# Patient Record
Sex: FEMALE | Race: WHITE | NOT HISPANIC OR LATINO | Employment: UNEMPLOYED | URBAN - METROPOLITAN AREA
[De-identification: names, ages, dates, MRNs, and addresses within clinical notes are randomized per-mention and may not be internally consistent; named-entity substitution may affect disease eponyms.]

---

## 2022-01-05 ENCOUNTER — HOSPITAL ENCOUNTER (EMERGENCY)
Facility: HOSPITAL | Age: 42
Discharge: HOME | End: 2022-01-05
Admitting: EMERGENCY MEDICINE

## 2022-01-05 ENCOUNTER — APPOINTMENT (OUTPATIENT)
Dept: GENERAL RADIOLOGY | Facility: HOSPITAL | Age: 42
End: 2022-01-05

## 2022-01-05 ENCOUNTER — APPOINTMENT (OUTPATIENT)
Dept: CT IMAGING | Facility: HOSPITAL | Age: 42
End: 2022-01-05

## 2022-01-05 VITALS
SYSTOLIC BLOOD PRESSURE: 152 MMHG | WEIGHT: 272.27 LBS | HEART RATE: 94 BPM | BODY MASS INDEX: 45.36 KG/M2 | DIASTOLIC BLOOD PRESSURE: 88 MMHG | OXYGEN SATURATION: 97 % | RESPIRATION RATE: 18 BRPM | TEMPERATURE: 99.1 F | HEIGHT: 65 IN

## 2022-01-05 DIAGNOSIS — J98.01 BRONCHOSPASM: ICD-10-CM

## 2022-01-05 DIAGNOSIS — R51.9 NONINTRACTABLE HEADACHE, UNSPECIFIED CHRONICITY PATTERN, UNSPECIFIED HEADACHE TYPE: ICD-10-CM

## 2022-01-05 DIAGNOSIS — U07.1 COVID: Primary | ICD-10-CM

## 2022-01-05 LAB
ALBUMIN SERPL-MCNC: 4 G/DL (ref 3.5–5.2)
ALBUMIN/GLOB SERPL: 1.2 G/DL
ALP SERPL-CCNC: 77 U/L (ref 39–117)
ALT SERPL W P-5'-P-CCNC: 8 U/L (ref 1–33)
ANION GAP SERPL CALCULATED.3IONS-SCNC: 12 MMOL/L (ref 5–15)
ANISOCYTOSIS BLD QL: NORMAL
AST SERPL-CCNC: 12 U/L (ref 1–32)
B PARAPERT DNA SPEC QL NAA+PROBE: NOT DETECTED
B PERT DNA SPEC QL NAA+PROBE: NOT DETECTED
B-HCG UR QL: NEGATIVE
BACTERIA UR QL AUTO: ABNORMAL /HPF
BASOPHILS # BLD AUTO: 0.1 10*3/MM3 (ref 0–0.2)
BASOPHILS NFR BLD AUTO: 1 % (ref 0–1.5)
BILIRUB SERPL-MCNC: 0.2 MG/DL (ref 0–1.2)
BILIRUB UR QL STRIP: NEGATIVE
BUN SERPL-MCNC: 5 MG/DL (ref 6–20)
BUN/CREAT SERPL: 6.8 (ref 7–25)
C PNEUM DNA NPH QL NAA+NON-PROBE: NOT DETECTED
CALCIUM SPEC-SCNC: 8.9 MG/DL (ref 8.6–10.5)
CHLORIDE SERPL-SCNC: 102 MMOL/L (ref 98–107)
CLARITY UR: CLEAR
CO2 SERPL-SCNC: 23 MMOL/L (ref 22–29)
COLOR UR: YELLOW
CREAT SERPL-MCNC: 0.73 MG/DL (ref 0.57–1)
D DIMER PPP FEU-MCNC: 0.61 MG/L (FEU) (ref 0–0.59)
DEPRECATED RDW RBC AUTO: 43.8 FL (ref 37–54)
EOSINOPHIL # BLD AUTO: 0.1 10*3/MM3 (ref 0–0.4)
EOSINOPHIL NFR BLD AUTO: 0.9 % (ref 0.3–6.2)
ERYTHROCYTE [DISTWIDTH] IN BLOOD BY AUTOMATED COUNT: 19.1 % (ref 12.3–15.4)
FERRITIN SERPL-MCNC: 7.21 NG/ML (ref 13–150)
FLUAV SUBTYP SPEC NAA+PROBE: NOT DETECTED
FLUBV RNA ISLT QL NAA+PROBE: NOT DETECTED
GFR SERPL CREATININE-BSD FRML MDRD: 88 ML/MIN/1.73
GLOBULIN UR ELPH-MCNC: 3.3 GM/DL
GLUCOSE BLDC GLUCOMTR-MCNC: 87 MG/DL (ref 70–105)
GLUCOSE SERPL-MCNC: 102 MG/DL (ref 65–99)
GLUCOSE UR STRIP-MCNC: NEGATIVE MG/DL
HADV DNA SPEC NAA+PROBE: NOT DETECTED
HCOV 229E RNA SPEC QL NAA+PROBE: NOT DETECTED
HCOV HKU1 RNA SPEC QL NAA+PROBE: NOT DETECTED
HCOV NL63 RNA SPEC QL NAA+PROBE: NOT DETECTED
HCOV OC43 RNA SPEC QL NAA+PROBE: NOT DETECTED
HCT VFR BLD AUTO: 27.4 % (ref 34–46.6)
HGB BLD-MCNC: 8.6 G/DL (ref 12–15.9)
HGB UR QL STRIP.AUTO: ABNORMAL
HMPV RNA NPH QL NAA+NON-PROBE: NOT DETECTED
HOLD SPECIMEN: NORMAL
HPIV1 RNA ISLT QL NAA+PROBE: NOT DETECTED
HPIV2 RNA SPEC QL NAA+PROBE: NOT DETECTED
HPIV3 RNA NPH QL NAA+PROBE: NOT DETECTED
HPIV4 P GENE NPH QL NAA+PROBE: NOT DETECTED
HYALINE CASTS UR QL AUTO: ABNORMAL /LPF
HYPOCHROMIA BLD QL: NORMAL
KETONES UR QL STRIP: NEGATIVE
LARGE PLATELETS: NORMAL
LDH SERPL-CCNC: 139 U/L (ref 135–214)
LEUKOCYTE ESTERASE UR QL STRIP.AUTO: NEGATIVE
LYMPHOCYTES # BLD AUTO: 0.5 10*3/MM3 (ref 0.7–3.1)
LYMPHOCYTES NFR BLD AUTO: 6.3 % (ref 19.6–45.3)
M PNEUMO IGG SER IA-ACNC: NOT DETECTED
MCH RBC QN AUTO: 20.3 PG (ref 26.6–33)
MCHC RBC AUTO-ENTMCNC: 31.3 G/DL (ref 31.5–35.7)
MCV RBC AUTO: 64.8 FL (ref 79–97)
MICROCYTES BLD QL: NORMAL
MONOCYTES # BLD AUTO: 0.5 10*3/MM3 (ref 0.1–0.9)
MONOCYTES NFR BLD AUTO: 7.2 % (ref 5–12)
NEUTROPHILS NFR BLD AUTO: 6.2 10*3/MM3 (ref 1.7–7)
NEUTROPHILS NFR BLD AUTO: 84.6 % (ref 42.7–76)
NITRITE UR QL STRIP: NEGATIVE
NRBC BLD AUTO-RTO: 0 /100 WBC (ref 0–0.2)
OVALOCYTES BLD QL SMEAR: NORMAL
PH UR STRIP.AUTO: 5.5 [PH] (ref 5–8)
PLATELET # BLD AUTO: 316 10*3/MM3 (ref 140–450)
PMV BLD AUTO: 6.7 FL (ref 6–12)
POLYCHROMASIA BLD QL SMEAR: NORMAL
POTASSIUM SERPL-SCNC: 3.8 MMOL/L (ref 3.5–5.2)
PROT SERPL-MCNC: 7.3 G/DL (ref 6–8.5)
PROT UR QL STRIP: NEGATIVE
RBC # BLD AUTO: 4.23 10*6/MM3 (ref 3.77–5.28)
RBC # UR STRIP: ABNORMAL /HPF
REF LAB TEST METHOD: ABNORMAL
RHINOVIRUS RNA SPEC NAA+PROBE: NOT DETECTED
RSV RNA NPH QL NAA+NON-PROBE: NOT DETECTED
S PYO AG THROAT QL: NEGATIVE
SARS-COV-2 RNA NPH QL NAA+NON-PROBE: DETECTED
SODIUM SERPL-SCNC: 137 MMOL/L (ref 136–145)
SP GR UR STRIP: 1.06 (ref 1–1.03)
SQUAMOUS #/AREA URNS HPF: ABNORMAL /HPF
STOMATOCYTES BLD QL SMEAR: NORMAL
TROPONIN T SERPL-MCNC: <0.01 NG/ML (ref 0–0.03)
UROBILINOGEN UR QL STRIP: ABNORMAL
WBC # UR STRIP: ABNORMAL /HPF
WBC MORPH BLD: NORMAL
WBC NRBC COR # BLD: 7.3 10*3/MM3 (ref 3.4–10.8)

## 2022-01-05 PROCEDURE — 85379 FIBRIN DEGRADATION QUANT: CPT | Performed by: PHYSICIAN ASSISTANT

## 2022-01-05 PROCEDURE — 85025 COMPLETE CBC W/AUTO DIFF WBC: CPT | Performed by: PHYSICIAN ASSISTANT

## 2022-01-05 PROCEDURE — 80053 COMPREHEN METABOLIC PANEL: CPT | Performed by: PHYSICIAN ASSISTANT

## 2022-01-05 PROCEDURE — 36415 COLL VENOUS BLD VENIPUNCTURE: CPT

## 2022-01-05 PROCEDURE — 71275 CT ANGIOGRAPHY CHEST: CPT

## 2022-01-05 PROCEDURE — 83615 LACTATE (LD) (LDH) ENZYME: CPT | Performed by: PHYSICIAN ASSISTANT

## 2022-01-05 PROCEDURE — 84484 ASSAY OF TROPONIN QUANT: CPT | Performed by: PHYSICIAN ASSISTANT

## 2022-01-05 PROCEDURE — 81025 URINE PREGNANCY TEST: CPT | Performed by: PHYSICIAN ASSISTANT

## 2022-01-05 PROCEDURE — 81001 URINALYSIS AUTO W/SCOPE: CPT | Performed by: PHYSICIAN ASSISTANT

## 2022-01-05 PROCEDURE — 93005 ELECTROCARDIOGRAM TRACING: CPT | Performed by: PHYSICIAN ASSISTANT

## 2022-01-05 PROCEDURE — 71045 X-RAY EXAM CHEST 1 VIEW: CPT

## 2022-01-05 PROCEDURE — 87651 STREP A DNA AMP PROBE: CPT | Performed by: PHYSICIAN ASSISTANT

## 2022-01-05 PROCEDURE — 0 IOPAMIDOL PER 1 ML: Performed by: PHYSICIAN ASSISTANT

## 2022-01-05 PROCEDURE — 82728 ASSAY OF FERRITIN: CPT | Performed by: PHYSICIAN ASSISTANT

## 2022-01-05 PROCEDURE — 0202U NFCT DS 22 TRGT SARS-COV-2: CPT | Performed by: PHYSICIAN ASSISTANT

## 2022-01-05 PROCEDURE — 70450 CT HEAD/BRAIN W/O DYE: CPT

## 2022-01-05 PROCEDURE — 82962 GLUCOSE BLOOD TEST: CPT

## 2022-01-05 PROCEDURE — 99284 EMERGENCY DEPT VISIT MOD MDM: CPT

## 2022-01-05 PROCEDURE — 85007 BL SMEAR W/DIFF WBC COUNT: CPT | Performed by: PHYSICIAN ASSISTANT

## 2022-01-05 RX ORDER — ONDANSETRON 4 MG/1
4 TABLET, ORALLY DISINTEGRATING ORAL EVERY 8 HOURS PRN
Qty: 20 TABLET | Refills: 0 | Status: SHIPPED | OUTPATIENT
Start: 2022-01-05

## 2022-01-05 RX ORDER — ACETAMINOPHEN 500 MG
1000 TABLET ORAL ONCE
Status: COMPLETED | OUTPATIENT
Start: 2022-01-05 | End: 2022-01-05

## 2022-01-05 RX ORDER — BROMPHENIRAMINE MALEATE, PSEUDOEPHEDRINE HYDROCHLORIDE, AND DEXTROMETHORPHAN HYDROBROMIDE 2; 30; 10 MG/5ML; MG/5ML; MG/5ML
5 SYRUP ORAL 4 TIMES DAILY PRN
Qty: 473 ML | Refills: 0 | Status: SHIPPED | OUTPATIENT
Start: 2022-01-05 | End: 2022-09-15

## 2022-01-05 RX ORDER — SODIUM CHLORIDE 0.9 % (FLUSH) 0.9 %
10 SYRINGE (ML) INJECTION AS NEEDED
Status: DISCONTINUED | OUTPATIENT
Start: 2022-01-05 | End: 2022-01-06 | Stop reason: HOSPADM

## 2022-01-05 RX ADMIN — SODIUM CHLORIDE 1000 ML: 9 INJECTION, SOLUTION INTRAVENOUS at 16:24

## 2022-01-05 RX ADMIN — ACETAMINOPHEN 1000 MG: 500 TABLET ORAL at 20:43

## 2022-01-05 RX ADMIN — IOPAMIDOL 100 ML: 755 INJECTION, SOLUTION INTRAVENOUS at 17:42

## 2022-01-05 NOTE — ED PROVIDER NOTES
Subjective   Chief Complaint: Headache, body aches, cough, dizzy, multiple complaints    Patient is a 41-year-old  female history of asthma presents to the ER with multiple complaints.  Patient states that she woke up this morning at 7 AM feeling lightheaded and dizzy.  She also reports a headache that is diffuse, throbbing that she rates a 10/10.  She reports some intermittent blurry vision in her right eye, no loss of vision.  She denies any slurred speech or facial droop.  She reports some chest pain and tightness that is worse with coughing.  She reports mildly productive cough but says that she has been swallowing all of her phlegm.  She does report sore throat and right ear pain.  Patient also complains of generalized body aches, abdominal pain, nausea and one episode of vomiting today which she reports had small specks of blood in it.  She denies any diarrhea.  Patient also reports dysuria.  She reports objective fever and chills has not taken her temperature at home.  Patient states that she did receive the Covid vaccine.    Location: Head    Quality: Throbbing    Duration: Today    Timing: Constant    Severity: Moderate severe    Associated Symptoms: Cough, sore throat, earache, body aches, chills    PCP: None      History provided by:  Patient      Review of Systems   Constitutional: Positive for chills. Negative for fever.   HENT: Positive for congestion, ear pain and sore throat. Negative for trouble swallowing.    Respiratory: Positive for cough, chest tightness, shortness of breath and wheezing.    Cardiovascular: Negative for chest pain.   Gastrointestinal: Positive for abdominal pain, nausea and vomiting. Negative for constipation and diarrhea.   Genitourinary: Negative for dysuria.   Musculoskeletal: Positive for myalgias.   Skin: Negative for rash.   Neurological: Positive for dizziness, weakness, light-headedness and headaches.   Psychiatric/Behavioral: Negative for behavioral problems.    All other systems reviewed and are negative.      History reviewed. No pertinent past medical history.    Allergies   Allergen Reactions   • Ibuprofen Anaphylaxis       History reviewed. No pertinent surgical history.    History reviewed. No pertinent family history.    Social History     Socioeconomic History   • Marital status: Single           Objective   Physical Exam  Vitals and nursing note reviewed.   Constitutional:       Appearance: Normal appearance. She is well-developed. She is obese. She is not ill-appearing or toxic-appearing.   HENT:      Head: Normocephalic and atraumatic.      Right Ear: External ear normal. There is no impacted cerumen.      Left Ear: External ear normal. There is no impacted cerumen.      Ears:      Comments: Erythematous, bulging right TM, no effusion     Nose: Nose normal.      Mouth/Throat:      Pharynx: Posterior oropharyngeal erythema present. No oropharyngeal exudate.      Comments: Posterior oropharynx erythema, right tonsillar swelling 2+, no stones, uvula is midline  Eyes:      Pupils: Pupils are equal, round, and reactive to light.   Neck:      Comments: Right anterior cervical adenopathy  Cardiovascular:      Rate and Rhythm: Regular rhythm. Tachycardia present.      Pulses: Normal pulses.      Heart sounds: Normal heart sounds. No murmur heard.      Pulmonary:      Effort: Pulmonary effort is normal. No respiratory distress.      Breath sounds: Wheezing present.   Chest:      Chest wall: No tenderness.   Abdominal:      General: Bowel sounds are normal. There is no distension.      Palpations: Abdomen is soft.      Tenderness: There is no abdominal tenderness.   Musculoskeletal:         General: Normal range of motion.      Cervical back: Normal range of motion and neck supple.   Lymphadenopathy:      Cervical: Cervical adenopathy present.   Skin:     General: Skin is warm and dry.      Capillary Refill: Capillary refill takes less than 2 seconds.      Findings: No  "erythema or rash.   Neurological:      General: No focal deficit present.      Mental Status: She is alert and oriented to person, place, and time.      Cranial Nerves: No cranial nerve deficit.      Sensory: No sensory deficit.   Psychiatric:         Mood and Affect: Mood normal.         Behavior: Behavior normal.         ECG 12 Lead      Date/Time: 1/6/2022 12:48 AM  Performed by: Danelle Randhawa PA  Authorized by: Danelle Randhawa PA   Interpreted by physician  Previous ECG: no previous ECG available  Rhythm: sinus tachycardia  Rate: tachycardic  BPM: 106  QRS axis: normal  Conduction: conduction normal  ST Segments: ST segments normal  T Waves: T waves normal  Other: no other findings  Clinical impression: non-specific ECG                 ED Course  ED Course as of 01/06/22 0049   Wed Jan 05, 2022 1929 Patient reevaluated, she reports that she feels somewhat better.  Patient is requesting something for her headache.  Patient notified of Covid test results [MM]      ED Course User Index  [MM] Danelle Randhawa PA    /88   Pulse 94   Temp 99.1 °F (37.3 °C)   Resp 18   Ht 165.1 cm (65\")   Wt 124 kg (272 lb 4.3 oz)   LMP 01/05/2022   SpO2 97%   Breastfeeding No   BMI 45.31 kg/m²   Labs Reviewed   RESPIRATORY PANEL PCR W/ COVID-19 (SARS-COV-2) REGINALD/CRISTIAN/COLIN/PAD/[facility] IN-HOUSE, NP SWAB IN UTM/VTP, 3-4 HR TAT - Abnormal; Notable for the following components:       Result Value    COVID19 Detected (*)     All other components within normal limits    Narrative:     In the setting of a positive respiratory panel with a viral infection PLUS a negative procalcitonin without other underlying concern for bacterial infection, consider observing off antibiotics or discontinuation of antibiotics and continue supportive care. If the respiratory panel is positive for atypical bacterial infection (Bordetella pertussis, Chlamydophila pneumoniae, or Mycoplasma pneumoniae), consider antibiotic de-escalation to " target atypical bacterial infection.   COMPREHENSIVE METABOLIC PANEL - Abnormal; Notable for the following components:    Glucose 102 (*)     BUN 5 (*)     BUN/Creatinine Ratio 6.8 (*)     All other components within normal limits    Narrative:     GFR Normal >60  Chronic Kidney Disease <60  Kidney Failure <15     D-DIMER, QUANTITATIVE - Abnormal; Notable for the following components:    D-Dimer, Quantitative 0.61 (*)     All other components within normal limits    Narrative:     Reference Range  --------------------------------------------------------------------     < 0.50   Negative Predictive Value  0.50-0.59   Indeterminate    >= 0.60   Probable VTE             A very low percentage of patients with DVT may yield D-Dimer results   below the cut-off of 0.50 mg/L FEU.  This is known to be more   prevalent in patients with distal DVT.             Results of this test should always be interpreted in conjunction with   the patient's medical history, clinical presentation and other   findings.  Clinical diagnosis should not be based on the result of   INNOVANCE D-Dimer alone.   FERRITIN - Abnormal; Notable for the following components:    Ferritin 7.21 (*)     All other components within normal limits    Narrative:     Results may be falsely decreased if patient taking Biotin.     URINALYSIS W/ CULTURE IF INDICATED - Abnormal; Notable for the following components:    Specific Gravity, UA 1.065 (*)     Blood, UA Trace (*)     All other components within normal limits   CBC WITH AUTO DIFFERENTIAL - Abnormal; Notable for the following components:    Hemoglobin 8.6 (*)     Hematocrit 27.4 (*)     MCV 64.8 (*)     MCH 20.3 (*)     MCHC 31.3 (*)     RDW 19.1 (*)     Neutrophil % 84.6 (*)     Lymphocyte % 6.3 (*)     Lymphocytes, Absolute 0.50 (*)     All other components within normal limits    Narrative:     Appended report. These results have been appended to a previously verified report.   URINALYSIS, MICROSCOPIC ONLY  - Abnormal; Notable for the following components:    RBC, UA 0-2 (*)     WBC, UA 0-2 (*)     All other components within normal limits   RAPID STREP A SCREEN - Normal   LACTATE DEHYDROGENASE - Normal   TROPONIN (IN-HOUSE) - Normal    Narrative:     Troponin T Reference Range:  <= 0.03 ng/mL-   Negative for AMI  >0.03 ng/mL-     Abnormal for myocardial necrosis.  Clinicians would have to utilize clinical acumen, EKG, Troponin and serial changes to determine if it is an Acute Myocardial Infarction or myocardial injury due to an underlying chronic condition.       Results may be falsely decreased if patient taking Biotin.     PREGNANCY, URINE - Normal   POCT GLUCOSE FINGERSTICK - Normal   RAINBOW DRAW    Narrative:     The following orders were created for panel order [place] Draw.  Procedure                               Abnormality         Status                     ---------                               -----------         ------                     Green Top (Gel)[[id number]]                                                             Lavender Top[[id number]]                                                                Gold Top - SST[[id number]]                                   Final result               Light Blue Top[[id number]]                                                                Please view results for these tests on the individual orders.   SCAN SLIDE   GOLD TOP - SST   CBC AND DIFFERENTIAL    Narrative:     The following orders were created for panel order CBC & Differential.  Procedure                               Abnormality         Status                     ---------                               -----------         ------                     CBC Auto Differential[[id number]]        Abnormal            Final result               Scan Slide[[id number]]                                       Final result                 Please view results for these tests on the individual orders.     Medications    sodium chloride 0.9 % flush 10 mL (has no administration in time range)   sodium chloride 0.9 % bolus 1,000 mL (0 mL Intravenous Stopped 1/5/22 2043)   iopamidol (ISOVUE-370) 76 % injection 100 mL (100 mL Intravenous Given 1/5/22 1742)   acetaminophen (TYLENOL) tablet 1,000 mg (1,000 mg Oral Given 1/5/22 2043)     CT Head Without Contrast    Result Date: 1/5/2022  No acute process demonstrated  Electronically Signed By-Zac Mcgarry On:1/5/2022 5:50 PM This report was finalized on [id number] by  Zac Mcgarry, .    XR Chest 1 View    Result Date: 1/5/2022  No active cardiac pulmonary disease  Electronically Signed By-Zac Mcgarry On:1/5/2022 4:41 PM This report was finalized on [id number] by  Zac Mcgarry, .    CT Chest Pulmonary Embolism    Result Date: 1/5/2022   1. No evidence of pulmonary embolism 2. Mild bronchial wall thickening suggesting bronchitis. No evidence of pneumonia  Electronically Signed ByMiguel Mcgarry On:1/5/2022 5:53 PM This report was finalized on [id number] by  Zac Mcgarry, .                                                 MDM  Number of Diagnoses or Management Options  Bronchospasm  COVID  Nonintractable headache, unspecified chronicity pattern, unspecified headache type  Diagnosis management comments: MEDICAL DECISION  Epic Chart Review: No recent admissions  Comorbidities: Asthma  Differentials: Pneumonia, Covid, viral syndrome, strep, PE; this list is not all inclusive and does not constitute the entirety of considered causes  Radiology interpretation:  Images reviewed by me and interpreted by radiologist, as above  Lab interpretation:  Labs viewed by me significant for, as above  EKG interpretation: Reviewed by myself interpreted by Dr. Stevenson, sinus tachycardia with a rate of 106 with no acute ST changes.    While in the ED IV was placed and labs were obtained appropriate PPE was worn during exam and throughout all encounters with the patient.  Patient had the  above evaluation.  IV established, lab work obtained.  Patient given Tylenol for headache.  Patient also given 1 L IV fluids.  Restaurant panel positive for Covid.  Urinalysis negative for UTI.  Strep negative.  CMP glucose 102, BUN 5, otherwise unremarkable.  Troponin normal.  CBC mild anemia hemoglobin 8.6, hematocrit 27.4.  D-dimer slightly elevated 0.61, subsequent PE protocol was ordered, no evidence of PE, there is mild bronchial wall thickening suggesting acute bronchitis, likely viral secondary to Covid.  No obvious pneumonia.  CT head shows no acute intracranial abnormality.  On reevaluation patient reports that she feels better.  Patient remained afebrile, nontoxic parents in no acute respiratory stress while in the ER.  Patient's symptoms likely related to Covid and viral syndrome.  Patient remained nonhypoxic throughout ER stay.  Patient discharged with prescription for Bromfed and Zofran she was encouraged to follow-up with her primary care provider for further management and evaluation.  Patient courage to quarantine for the next 5 days and continue to wear a mask around other people for the remaining 5 days after that.    Discharge plan and instructions were discussed with the patient who verbalized understanding and is in agreement with the plan, all questions were answered at this time.  Patient is aware of signs symptoms that would require immediate return to the emergency room.  Patient understands importance of following up with primary care provider for further evaluation and worsening concerns as well as blood pressure recheck in the next 4 weeks.    Patient was discharged in improved stable condition with an upright steady gait.         Amount and/or Complexity of Data Reviewed  Clinical lab tests: reviewed and ordered  Tests in the radiology section of CPT®: reviewed and ordered  Tests in the medicine section of CPT®: reviewed    Patient Progress  Patient progress: stable      Final  diagnoses:   COVID   Nonintractable headache, unspecified chronicity pattern, unspecified headache type   Bronchospasm       ED Disposition  ED Disposition     ED Disposition Condition Comment    Discharge Stable           Rafael Nick MD  [address] [facility] [address]  [place] [address] [phone]    Schedule an appointment as soon as possible for a visit in 2 days  If symptoms worsen, Return to the ER for new or worsening symptoms         Medication List      New Prescriptions    brompheniramine-pseudoephedrine-DM 30-2-10 MG/5ML syrup  Take 5 mL by mouth 4 (Four) Times a Day As Needed for Congestion or Cough.     ondansetron ODT 4 MG disintegrating tablet  Commonly known as: Zofran ODT  Place 1 tablet under the tongue Every 8 (Eight) Hours As Needed for Nausea.           Where to Get Your Medications      These medications were sent to [facility]/pharmacy #[id number] - [place], [place] - [address] [facility] AT [facility] - [phone] [facility] [phone] [address] [place] [address]    Phone: [phone]   · brompheniramine-pseudoephedrine-DM 30-2-10 MG/5ML syrup  · ondansetron ODT 4 MG disintegrating tablet          Danelle Randhawa PA  01/06/22 0049

## 2022-01-06 NOTE — DISCHARGE INSTRUCTIONS
Take Bromfed as needed for cough and congestion  Take Tylenol as needed for headache or fever  Take Zofran as needed for nausea vomiting    You must quarantine for the next 5 days, after this you must continue to wear a mask for the next 5 days.  Drink plenty of fluids    Follow-up with your primary care provider within the next few days.    Return to the ER for new or worsening symptoms

## 2022-01-09 LAB — QT INTERVAL: 336 MS

## 2022-09-09 ENCOUNTER — TELEPHONE (OUTPATIENT)
Dept: ONCOLOGY | Facility: CLINIC | Age: 42
End: 2022-09-09

## 2022-09-14 NOTE — PROGRESS NOTES
HEMATOLOGY ONCOLOGY OUTPATIENT CONSULTATION       Patient name: Aurelia Arvizu  : 1980  MRN: [id number]  Primary Care Physician: Rafael Nick MD  Referring Physician: Rafael Nick MD  Reason For Consult:     Chief Complaint   Patient presents with   • Follow-up     Microcytic, hypochromic anemia     HPI:   History of Present Illness:  Aurelia Arvizu is 42 y.o. female who presented to the office on 09/15/22 for consultation regarding microcytic hypochromic anemia    9/15/2022: In the office for the first time.  Close to the time of this visit had developed an upper respiratory tract infection.  She was seen by her primary care physician who, among other things, ordered laboratory exams.  A blood count, apparently, reported microcytic and hypochromic anemia and she was referred for investigation.  At the time of this visit she was complaining of a persistent cough for which she had been receiving treatment.  She denied any fevers or unintended weight loss.  On direct questioning admitted to heavy menstrual bleeding every 4 weeks.  She described having to wear 2 pads at a time to prevent bleeding through and having to change between 4 and 6 times during the day and usually around 2 times during the night.    Subjective:  • 09/15/22.  Found to have microcytic anemia on a routine blood count.  Gave a history of menorrhagia.  Denied any history of hemoptysis or hematemesis.  She also denied melena or hematochezia but admitted to menorrhalgia as described above.    The following portions of the patient's history were reviewed and updated as appropriate: allergies, current medications, past family history, past medical history, past social history, past surgical history and problem list.    Past Medical History:   Diagnosis Date   • Bipolar disorder (HCC)      Past Surgical History:   Procedure Laterality Date   • CHOLECYSTECTOMY         Current Outpatient Medications:    •  albuterol sulfate  (90 Base) MCG/ACT inhaler, albuterol sulfate HFA 90 mcg/actuation aerosol inhaler  INHALE 2 PUFFS EVERY 4 HOURS BY INHALATION ROUTE., Disp: , Rfl:   •  Cariprazine HCl (VRAYLAR) 1.5 MG capsule capsule, Vraylar 1.5 mg capsule  TAKE 1 CAPSULE BY MOUTH EVERY DAY, Disp: , Rfl:   •  Cholecalciferol 125 MCG (5000 UT) tablet, Vitamin D3 125 mcg (5,000 unit) tablet  Take 1 tablet every day by oral route., Disp: , Rfl:   •  famotidine (PEPCID) 20 MG tablet, famotidine 20 mg tablet  TAKE 1 TABLET BY MOUTH TWICE A DAY, Disp: , Rfl:   •  lamoTRIgine (LaMICtal) 100 MG tablet, LAMICTAL TABS, Disp: , Rfl:   •  Lurasidone HCl (LATUDA) 20 MG tablet tablet, Latuda 20 mg tablet  TAKE 1 TABLET BY MOUTH EVERY DAY, Disp: , Rfl:   •  naproxen (NAPROSYN) 500 MG tablet, NAPROXEN 500 MG TABS, Disp: , Rfl:   •  ondansetron ODT (Zofran ODT) 4 MG disintegrating tablet, Place 1 tablet under the tongue Every 8 (Eight) Hours As Needed for Nausea., Disp: 20 tablet, Rfl: 0  •  raNITIdine (ZANTAC) 150 MG tablet, ranitidine 150 mg tablet, Disp: , Rfl:   •  sertraline (ZOLOFT) 100 MG tablet, sertraline 100 mg tablet  TAKE 1 TABLET BY MOUTH EVERY DAY, Disp: , Rfl:   •  topiramate (TOPAMAX) 50 MG tablet, Every 12 (Twelve) Hours., Disp: , Rfl:     Allergies   Allergen Reactions   • Ibuprofen Anaphylaxis     Family History   Problem Relation Age of Onset   • Ovarian cancer Mother 46   • Kidney cancer Mother 54     Cancer-related family history includes Kidney cancer (age of onset: 54) in her mother; Ovarian cancer (age of onset: 46) in her mother.    Social History     Tobacco Use   • Smoking status: Never Smoker   • Smokeless tobacco: Never Used   Vaping Use   • Vaping Use: Never used   Substance Use Topics   • Alcohol use: Yes     Comment: Occasional   • Drug use: Never     Social History     Social History Narrative   • Not on file      ROS:     Review of Systems   Constitutional: Positive for fatigue. Negative for  "activity change, appetite change, chills, diaphoresis, fever and unexpected weight change.   HENT: Negative for congestion, dental problem, drooling, ear discharge, ear pain, facial swelling, hearing loss, mouth sores, nosebleeds, postnasal drip, rhinorrhea, sinus pressure, sinus pain, sneezing, sore throat, tinnitus, trouble swallowing and voice change.    Eyes: Negative for photophobia, pain, discharge, redness, itching and visual disturbance.   Respiratory: Positive for cough. Negative for apnea, choking, chest tightness, shortness of breath, wheezing and stridor.    Cardiovascular: Negative for chest pain, palpitations and leg swelling.   Gastrointestinal: Negative for abdominal distention, abdominal pain, anal bleeding, blood in stool, constipation, diarrhea, nausea, rectal pain and vomiting.   Endocrine: Negative for cold intolerance, heat intolerance, polydipsia and polyuria.   Genitourinary: Negative for decreased urine volume, difficulty urinating, dysuria, flank pain, frequency, genital sores, hematuria and urgency.   Musculoskeletal: Negative for arthralgias, back pain, gait problem, joint swelling, myalgias, neck pain and neck stiffness.   Skin: Negative for color change, pallor and rash.   Neurological: Negative for dizziness, tremors, seizures, syncope, facial asymmetry, speech difficulty, weakness, light-headedness, numbness and headaches.   Hematological: Negative for adenopathy. Does not bruise/bleed easily.   Psychiatric/Behavioral: Negative for agitation, behavioral problems, confusion, decreased concentration, hallucinations, self-injury, sleep disturbance and suicidal ideas. The patient is not nervous/anxious.      Objective:    Vitals:    09/15/22 1308   BP: 112/84   Pulse: 104   SpO2: 98%   Weight: 119 kg (263 lb)  Comment: verbal   Height: 165.1 cm (65\")   PainSc: 0-No pain     Body mass index is 43.77 kg/m².  ECOG  (0) Fully active, able to carry on all predisease performance without " restriction    Physical Exam:     Physical Exam  Constitutional:       General: She is not in acute distress.     Appearance: She is obese. She is not ill-appearing, toxic-appearing or diaphoretic.   HENT:      Head: Normocephalic and atraumatic.      Right Ear: External ear normal.      Left Ear: External ear normal.      Nose: Nose normal.      Mouth/Throat:      Mouth: Mucous membranes are moist.      Pharynx: Oropharynx is clear. No oropharyngeal exudate or posterior oropharyngeal erythema.   Eyes:      General: No scleral icterus.        Right eye: No discharge.         Left eye: No discharge.      Conjunctiva/sclera: Conjunctivae normal.      Pupils: Pupils are equal, round, and reactive to light.   Cardiovascular:      Rate and Rhythm: Normal rate and regular rhythm.      Pulses: Normal pulses.      Heart sounds: No murmur heard.    No friction rub. No gallop.   Pulmonary:      Effort: No respiratory distress.      Breath sounds: No stridor. No wheezing, rhonchi or rales.   Abdominal:      General: Abdomen is flat. Bowel sounds are normal. There is no distension.      Palpations: Abdomen is soft. There is no mass.      Tenderness: There is no abdominal tenderness. There is no right CVA tenderness, left CVA tenderness, guarding or rebound.      Hernia: No hernia is present.   Musculoskeletal:         General: No swelling, tenderness, deformity or signs of injury.      Cervical back: No rigidity.      Right lower leg: No edema.      Left lower leg: No edema.   Lymphadenopathy:      Cervical: No cervical adenopathy.   Skin:     Coloration: Skin is not jaundiced.      Findings: No bruising, lesion or rash.   Neurological:      General: No focal deficit present.      Mental Status: She is alert and oriented to person, place, and time.      Cranial Nerves: No cranial nerve deficit.      Motor: No weakness.      Gait: Gait normal.   Psychiatric:         Mood and Affect: Mood normal.         Behavior: Behavior  normal.         Thought Content: Thought content normal.         Judgment: Judgment normal.     I Lucio Landaverde MD performed the physical exam on 9/15/2022 as documented above    Lab Results - Last 18 Months   Lab Units 09/15/22  1226 01/05/22  1617   WBC 10*3/mm3 10.87* 7.30   HEMOGLOBIN g/dL 9.5* 8.6*   HEMATOCRIT % 32.1* 27.4*   PLATELETS 10*3/mm3 320 316   MCV fL 67.9* 64.8*     Lab Results - Last 18 Months   Lab Units 01/05/22  1617   SODIUM mmol/L 137   POTASSIUM mmol/L 3.8   CHLORIDE mmol/L 102   CO2 mmol/L 23.0   BUN mg/dL 5*   CREATININE mg/dL 0.73   CALCIUM mg/dL 8.9   BILIRUBIN mg/dL 0.2   ALK PHOS U/L 77   ALT (SGPT) U/L 8   AST (SGOT) U/L 12   GLUCOSE mg/dL 102*     Lab Results   Component Value Date    GLUCOSE 102 (H) 01/05/2022    BUN 5 (L) 01/05/2022    CREATININE 0.73 01/05/2022    EGFRIFNONA 88 01/05/2022    BCR 6.8 (L) 01/05/2022    K 3.8 01/05/2022    CO2 23.0 01/05/2022    CALCIUM 8.9 01/05/2022    ALBUMIN 4.00 01/05/2022    AST 12 01/05/2022    ALT 8 01/05/2022     Lab Results   Component Value Date    FERRITIN 7.21 (L) 01/05/2022     LDH   Date Value Ref Range Status   01/05/2022 139 135 - 214 U/L Final     Assessment & Plan     Assessment:  1. Microcytic anemia: Likely the result of iron deficiency and this probably related to her menstrual losses.  Will investigate further.  Discussed with her at length.  2. Reviewed all the records including medical notes from Dr. Hall's office, laboratory exams and imaging studies.  3. She is to see me again with results in 3 to 4 weeks.    Plan:  1. As above    Lucio Landaverde MD on 9/15/2022 at 1347

## 2022-09-15 ENCOUNTER — PATIENT ROUNDING (BHMG ONLY) (OUTPATIENT)
Dept: ONCOLOGY | Facility: CLINIC | Age: 42
End: 2022-09-15

## 2022-09-15 ENCOUNTER — LAB (OUTPATIENT)
Dept: LAB | Facility: HOSPITAL | Age: 42
End: 2022-09-15

## 2022-09-15 ENCOUNTER — CONSULT (OUTPATIENT)
Dept: ONCOLOGY | Facility: CLINIC | Age: 42
End: 2022-09-15

## 2022-09-15 VITALS
WEIGHT: 263 LBS | SYSTOLIC BLOOD PRESSURE: 112 MMHG | HEIGHT: 65 IN | HEART RATE: 104 BPM | DIASTOLIC BLOOD PRESSURE: 84 MMHG | OXYGEN SATURATION: 98 % | BODY MASS INDEX: 43.82 KG/M2

## 2022-09-15 DIAGNOSIS — D50.9 MICROCYTIC HYPOCHROMIC ANEMIA: ICD-10-CM

## 2022-09-15 DIAGNOSIS — D50.9 MICROCYTIC HYPOCHROMIC ANEMIA: Primary | ICD-10-CM

## 2022-09-15 LAB
ALBUMIN SERPL-MCNC: 4.2 G/DL (ref 3.5–5.2)
ALBUMIN/GLOB SERPL: 1.2 G/DL
ALP SERPL-CCNC: 74 U/L (ref 39–117)
ALT SERPL W P-5'-P-CCNC: 7 U/L (ref 1–33)
ANION GAP SERPL CALCULATED.3IONS-SCNC: 11 MMOL/L (ref 5–15)
AST SERPL-CCNC: 10 U/L (ref 1–32)
BASOPHILS # BLD AUTO: 0.04 10*3/MM3 (ref 0–0.2)
BASOPHILS NFR BLD AUTO: 0.4 % (ref 0–1.5)
BILIRUB SERPL-MCNC: 0.2 MG/DL (ref 0–1.2)
BUN SERPL-MCNC: 10 MG/DL (ref 6–20)
BUN/CREAT SERPL: 11.2 (ref 7–25)
CALCIUM SPEC-SCNC: 8.9 MG/DL (ref 8.6–10.5)
CHLORIDE SERPL-SCNC: 100 MMOL/L (ref 98–107)
CO2 SERPL-SCNC: 26 MMOL/L (ref 22–29)
CREAT SERPL-MCNC: 0.89 MG/DL (ref 0.57–1)
DAT POLY-SP REAG RBC QL: NEGATIVE
DEPRECATED RDW RBC AUTO: 42 FL (ref 37–54)
EGFRCR SERPLBLD CKD-EPI 2021: 83.1 ML/MIN/1.73
EOSINOPHIL # BLD AUTO: 0.18 10*3/MM3 (ref 0–0.4)
EOSINOPHIL NFR BLD AUTO: 1.7 % (ref 0.3–6.2)
ERYTHROCYTE [DISTWIDTH] IN BLOOD BY AUTOMATED COUNT: 17.9 % (ref 12.3–15.4)
FERRITIN SERPL-MCNC: 6.96 NG/ML (ref 13–150)
FOLATE SERPL-MCNC: 4.57 NG/ML (ref 4.78–24.2)
GLOBULIN UR ELPH-MCNC: 3.6 GM/DL
GLUCOSE SERPL-MCNC: 113 MG/DL (ref 65–99)
HAPTOGLOB SERPL-MCNC: 236 MG/DL (ref 30–200)
HCT VFR BLD AUTO: 32.1 % (ref 34–46.6)
HGB BLD-MCNC: 9.5 G/DL (ref 12–15.9)
IRON 24H UR-MRATE: 12 MCG/DL (ref 37–145)
IRON SATN MFR SERPL: 3 % (ref 20–50)
LDH SERPL-CCNC: 137 U/L (ref 135–214)
LYMPHOCYTES # BLD AUTO: 3.14 10*3/MM3 (ref 0.7–3.1)
LYMPHOCYTES NFR BLD AUTO: 28.9 % (ref 19.6–45.3)
MCH RBC QN AUTO: 20.1 PG (ref 26.6–33)
MCHC RBC AUTO-ENTMCNC: 29.6 G/DL (ref 31.5–35.7)
MCV RBC AUTO: 67.9 FL (ref 79–97)
MONOCYTES # BLD AUTO: 0.84 10*3/MM3 (ref 0.1–0.9)
MONOCYTES NFR BLD AUTO: 7.7 % (ref 5–12)
NEUTROPHILS NFR BLD AUTO: 6.67 10*3/MM3 (ref 1.7–7)
NEUTROPHILS NFR BLD AUTO: 61.3 % (ref 42.7–76)
PLATELET # BLD AUTO: 320 10*3/MM3 (ref 140–450)
PMV BLD AUTO: 9.9 FL (ref 6–12)
POTASSIUM SERPL-SCNC: 3.6 MMOL/L (ref 3.5–5.2)
PROT SERPL-MCNC: 7.8 G/DL (ref 6–8.5)
RBC # BLD AUTO: 4.73 10*6/MM3 (ref 3.77–5.28)
RETICS # AUTO: 0.11 10*6/MM3 (ref 0.02–0.13)
RETICS/RBC NFR AUTO: 2.36 % (ref 0.7–1.9)
SODIUM SERPL-SCNC: 137 MMOL/L (ref 136–145)
TIBC SERPL-MCNC: 471 MCG/DL (ref 298–536)
TRANSFERRIN SERPL-MCNC: 316 MG/DL (ref 200–360)
VIT B12 BLD-MCNC: 282 PG/ML (ref 211–946)
WBC NRBC COR # BLD: 10.87 10*3/MM3 (ref 3.4–10.8)

## 2022-09-15 PROCEDURE — 82728 ASSAY OF FERRITIN: CPT | Performed by: INTERNAL MEDICINE

## 2022-09-15 PROCEDURE — 82607 VITAMIN B-12: CPT | Performed by: INTERNAL MEDICINE

## 2022-09-15 PROCEDURE — 83615 LACTATE (LD) (LDH) ENZYME: CPT | Performed by: INTERNAL MEDICINE

## 2022-09-15 PROCEDURE — 86880 COOMBS TEST DIRECT: CPT | Performed by: INTERNAL MEDICINE

## 2022-09-15 PROCEDURE — 83010 ASSAY OF HAPTOGLOBIN QUANT: CPT | Performed by: INTERNAL MEDICINE

## 2022-09-15 PROCEDURE — 83540 ASSAY OF IRON: CPT | Performed by: INTERNAL MEDICINE

## 2022-09-15 PROCEDURE — 36415 COLL VENOUS BLD VENIPUNCTURE: CPT | Performed by: INTERNAL MEDICINE

## 2022-09-15 PROCEDURE — 80053 COMPREHEN METABOLIC PANEL: CPT | Performed by: INTERNAL MEDICINE

## 2022-09-15 PROCEDURE — 99203 OFFICE O/P NEW LOW 30 MIN: CPT | Performed by: INTERNAL MEDICINE

## 2022-09-15 PROCEDURE — 84466 ASSAY OF TRANSFERRIN: CPT | Performed by: INTERNAL MEDICINE

## 2022-09-15 PROCEDURE — 82746 ASSAY OF FOLIC ACID SERUM: CPT | Performed by: INTERNAL MEDICINE

## 2022-09-15 PROCEDURE — 85025 COMPLETE CBC W/AUTO DIFF WBC: CPT

## 2022-09-15 PROCEDURE — 85045 AUTOMATED RETICULOCYTE COUNT: CPT | Performed by: INTERNAL MEDICINE

## 2022-09-15 RX ORDER — ALBUTEROL SULFATE 90 UG/1
AEROSOL, METERED RESPIRATORY (INHALATION)
COMMUNITY

## 2022-09-15 RX ORDER — NAPROXEN 500 MG/1
TABLET ORAL
COMMUNITY
Start: 2012-12-19

## 2022-09-15 RX ORDER — SERTRALINE HYDROCHLORIDE 100 MG/1
TABLET, FILM COATED ORAL
COMMUNITY

## 2022-09-15 RX ORDER — TOPIRAMATE 50 MG/1
TABLET, FILM COATED ORAL EVERY 12 HOURS SCHEDULED
COMMUNITY

## 2022-09-15 RX ORDER — LAMOTRIGINE 100 MG/1
TABLET ORAL
COMMUNITY
Start: 2013-10-22

## 2022-09-15 RX ORDER — FAMOTIDINE 20 MG/1
TABLET, FILM COATED ORAL
COMMUNITY

## 2022-09-15 RX ORDER — LURASIDONE HYDROCHLORIDE 20 MG/1
TABLET, FILM COATED ORAL
COMMUNITY

## 2022-09-15 RX ORDER — RANITIDINE 150 MG/1
TABLET ORAL
COMMUNITY

## 2022-09-15 NOTE — PROGRESS NOTES
September 15, 2022    Hello, may I speak with Aurelia Arvizu?    My name is Jessica Chance      I am  with [facility] [address] [place] [address] [phone].    Before we get started may I verify your date of birth? 1980    I am calling to officially welcome you to our practice and ask about your recent visit. Is this a good time to talk? no    Tell me about your visit with us. What things went well?  A My Chart message was sent to the patient.       We're always looking for ways to make our patients' experiences even better. Do you have recommendations on ways we may improve?  no    Overall were you satisfied with your first visit to our practice? yes       I appreciate you taking the time to speak with me today. Is there anything else I can do for you? no      Thank you, and have a great day.

## 2022-09-19 ENCOUNTER — LAB (OUTPATIENT)
Dept: LAB | Facility: HOSPITAL | Age: 42
End: 2022-09-19

## 2022-09-19 LAB — HEMOCCULT STL QL IA: POSITIVE

## 2022-09-19 PROCEDURE — 82274 ASSAY TEST FOR BLOOD FECAL: CPT | Performed by: INTERNAL MEDICINE

## 2022-10-19 ENCOUNTER — TELEPHONE (OUTPATIENT)
Dept: ONCOLOGY | Facility: CLINIC | Age: 42
End: 2022-10-19

## 2022-10-19 NOTE — PROGRESS NOTES
HEMATOLOGY ONCOLOGY OUTPATIENT CONSULTATION       Patient name: Aurelia Arvizu  : 1980  MRN: [id number]  Primary Care Physician: Rafael Nick MD  Referring Physician: Rafael Nick MD  Reason For Consult:     No chief complaint on file.    HPI:   History of Present Illness:  Aurelia Arvizu is 42 y.o. female who presented to the office on 09/15/22 for consultation regarding microcytic hypochromic anemia    9/15/2022: In the office for the first time.  Close to the time of this visit had developed an upper respiratory tract infection.  She was seen by her primary care physician who, among other things, ordered laboratory exams.  A blood count, apparently, reported microcytic and hypochromic anemia and she was referred for investigation.  At the time of this visit she was complaining of a persistent cough for which she had been receiving treatment.  She denied any fevers or unintended weight loss.  On direct questioning admitted to heavy menstrual bleeding every 4 weeks.  She described having to wear 2 pads at a time to prevent bleeding through and having to change between 4 and 6 times during the day and usually around 2 times during the night.    10/20/2022: For follow up. Felt about the same. Today she reported that she was not taking any of the medications. Had no new symptoms. She was found to have positive occult blood in stool. Indeed she was found to have iron deficiency, very clearly. Finally there was folate deficiency and she admitted to consuming very little amounts of fresh fruits and vegetable, especially leafy vegetable.     Subjective:  • 09/15/22.  Found to have microcytic anemia on a routine blood count.  Gave a history of menorrhagia.  Denied any history of hemoptysis or hematemesis.  She also denied melena or hematochezia but admitted to menorrhalgia as described above.    The following portions of the patient's history were reviewed and updated as  appropriate: allergies, current medications, past family history, past medical history, past social history, past surgical history and problem list.    Past Medical History:   Diagnosis Date   • Bipolar disorder (HCC) 2012     Past Surgical History:   Procedure Laterality Date   • CHOLECYSTECTOMY         Current Outpatient Medications:   •  albuterol sulfate  (90 Base) MCG/ACT inhaler, albuterol sulfate HFA 90 mcg/actuation aerosol inhaler  INHALE 2 PUFFS EVERY 4 HOURS BY INHALATION ROUTE., Disp: , Rfl:   •  Cariprazine HCl (VRAYLAR) 1.5 MG capsule capsule, Vraylar 1.5 mg capsule  TAKE 1 CAPSULE BY MOUTH EVERY DAY, Disp: , Rfl:   •  Cholecalciferol 125 MCG (5000 UT) tablet, Vitamin D3 125 mcg (5,000 unit) tablet  Take 1 tablet every day by oral route., Disp: , Rfl:   •  famotidine (PEPCID) 20 MG tablet, famotidine 20 mg tablet  TAKE 1 TABLET BY MOUTH TWICE A DAY, Disp: , Rfl:   •  ferrous gluconate (FERGON) 324 MG tablet, Take 1 tablet by mouth Daily With Breakfast., Disp: 1 tablet, Rfl: 11  •  folic acid (FOLVITE) 1 MG tablet, Take 1 tablet by mouth Daily., Disp: 30 tablet, Rfl: 11  •  lamoTRIgine (LaMICtal) 100 MG tablet, LAMICTAL TABS, Disp: , Rfl:   •  Lurasidone HCl (LATUDA) 20 MG tablet tablet, Latuda 20 mg tablet  TAKE 1 TABLET BY MOUTH EVERY DAY, Disp: , Rfl:   •  naproxen (NAPROSYN) 500 MG tablet, NAPROXEN 500 MG TABS, Disp: , Rfl:   •  ondansetron ODT (Zofran ODT) 4 MG disintegrating tablet, Place 1 tablet under the tongue Every 8 (Eight) Hours As Needed for Nausea., Disp: 20 tablet, Rfl: 0  •  raNITIdine (ZANTAC) 150 MG tablet, ranitidine 150 mg tablet, Disp: , Rfl:   •  sertraline (ZOLOFT) 100 MG tablet, sertraline 100 mg tablet  TAKE 1 TABLET BY MOUTH EVERY DAY, Disp: , Rfl:   •  topiramate (TOPAMAX) 50 MG tablet, Every 12 (Twelve) Hours., Disp: , Rfl:     Allergies   Allergen Reactions   • Ibuprofen Anaphylaxis     Family History   Problem Relation Age of Onset   • Ovarian cancer Mother 46   •  Kidney cancer Mother 54     Cancer-related family history includes Kidney cancer (age of onset: 54) in her mother; Ovarian cancer (age of onset: 46) in her mother.    Social History     Tobacco Use   • Smoking status: Never   • Smokeless tobacco: Never   Vaping Use   • Vaping Use: Never used   Substance Use Topics   • Alcohol use: Yes     Comment: Occasional   • Drug use: Never     Social History     Social History Narrative   • Not on file      ROS:     Review of Systems   Constitutional: Positive for fatigue. Negative for activity change, appetite change, chills, diaphoresis, fever and unexpected weight change.   HENT: Negative for congestion, dental problem, drooling, ear discharge, ear pain, facial swelling, hearing loss, mouth sores, nosebleeds, postnasal drip, rhinorrhea, sinus pressure, sinus pain, sneezing, sore throat, tinnitus, trouble swallowing and voice change.    Eyes: Negative for photophobia, pain, discharge, redness, itching and visual disturbance.   Respiratory: Negative for apnea, cough, choking, chest tightness, shortness of breath, wheezing and stridor.    Cardiovascular: Negative for chest pain, palpitations and leg swelling.   Gastrointestinal: Negative for abdominal distention, abdominal pain, anal bleeding, blood in stool, constipation, diarrhea, nausea, rectal pain and vomiting.   Endocrine: Negative for cold intolerance, heat intolerance, polydipsia and polyuria.   Genitourinary: Negative for decreased urine volume, difficulty urinating, dysuria, flank pain, frequency, genital sores, hematuria and urgency.   Musculoskeletal: Negative for arthralgias, back pain, gait problem, joint swelling, myalgias, neck pain and neck stiffness.   Skin: Negative for color change, pallor and rash.   Neurological: Negative for dizziness, tremors, seizures, syncope, facial asymmetry, speech difficulty, weakness, light-headedness, numbness and headaches.   Hematological: Negative for adenopathy. Does not  "bruise/bleed easily.   Psychiatric/Behavioral: Negative for agitation, behavioral problems, confusion, decreased concentration, hallucinations, self-injury, sleep disturbance and suicidal ideas. The patient is not nervous/anxious.      Objective:    Vitals:    10/20/22 1256   BP: 108/72   Pulse: 88   Temp: 98.8 °F (37.1 °C)   SpO2: 99%   Weight: 118 kg (260 lb)  Comment: verbal   Height: 165.1 cm (65\")   PainSc: 0-No pain     Body mass index is 43.27 kg/m².  ECOG  (0) Fully active, able to carry on all predisease performance without restriction    Physical Exam:     Physical Exam  Constitutional:       General: She is not in acute distress.     Appearance: She is obese. She is not ill-appearing, toxic-appearing or diaphoretic.   HENT:      Head: Normocephalic and atraumatic.      Right Ear: External ear normal.      Left Ear: External ear normal.      Nose: Nose normal.      Mouth/Throat:      Mouth: Mucous membranes are moist.      Pharynx: Oropharynx is clear. No oropharyngeal exudate or posterior oropharyngeal erythema.   Eyes:      General: No scleral icterus.        Right eye: No discharge.         Left eye: No discharge.      Conjunctiva/sclera: Conjunctivae normal.      Pupils: Pupils are equal, round, and reactive to light.   Cardiovascular:      Rate and Rhythm: Normal rate and regular rhythm.      Pulses: Normal pulses.      Heart sounds: No murmur heard.    No friction rub. No gallop.   Pulmonary:      Effort: No respiratory distress.      Breath sounds: No stridor. No wheezing, rhonchi or rales.   Abdominal:      General: Abdomen is flat. Bowel sounds are normal. There is no distension.      Palpations: Abdomen is soft. There is no mass.      Tenderness: There is no abdominal tenderness. There is no right CVA tenderness, left CVA tenderness, guarding or rebound.      Hernia: No hernia is present.   Musculoskeletal:         General: No swelling, tenderness, deformity or signs of injury.      Cervical " back: No rigidity.      Right lower leg: No edema.      Left lower leg: No edema.   Lymphadenopathy:      Cervical: No cervical adenopathy.   Skin:     Coloration: Skin is not jaundiced.      Findings: No bruising, lesion or rash.   Neurological:      General: No focal deficit present.      Mental Status: She is alert and oriented to person, place, and time.      Cranial Nerves: No cranial nerve deficit.      Motor: No weakness.      Gait: Gait normal.   Psychiatric:         Mood and Affect: Mood normal.         Behavior: Behavior normal.         Thought Content: Thought content normal.         Judgment: Judgment normal.     VIKAS Landaverde MD performed the physical exam on 10/20/2022 as documented above.     Lab Results - Last 18 Months   Lab Units 09/15/22  1226 01/05/22  1617   WBC 10*3/mm3 10.87* 7.30   HEMOGLOBIN g/dL 9.5* 8.6*   HEMATOCRIT % 32.1* 27.4*   PLATELETS 10*3/mm3 320 316   MCV fL 67.9* 64.8*     Lab Results - Last 18 Months   Lab Units 09/15/22  1359 01/05/22  1617   SODIUM mmol/L 137 137   POTASSIUM mmol/L 3.6 3.8   CHLORIDE mmol/L 100 102   CO2 mmol/L 26.0 23.0   BUN mg/dL 10 5*   CREATININE mg/dL 0.89 0.73   CALCIUM mg/dL 8.9 8.9   BILIRUBIN mg/dL 0.2 0.2   ALK PHOS U/L 74 77   ALT (SGPT) U/L 7 8   AST (SGOT) U/L 10 12   GLUCOSE mg/dL 113* 102*     Lab Results   Component Value Date    GLUCOSE 113 (H) 09/15/2022    BUN 10 09/15/2022    CREATININE 0.89 09/15/2022    EGFRIFNONA 88 01/05/2022    BCR 11.2 09/15/2022    K 3.6 09/15/2022    CO2 26.0 09/15/2022    CALCIUM 8.9 09/15/2022    ALBUMIN 4.20 09/15/2022    AST 10 09/15/2022    ALT 7 09/15/2022     Lab Results   Component Value Date    IRON 12 (L) 09/15/2022    TIBC 471 09/15/2022    FERRITIN 6.96 (L) 09/15/2022     LDH   Date Value Ref Range Status   09/15/2022 137 135 - 214 U/L Final     Assessment & Plan     Assessment:  1. Iron deficiency anemia. The iron studies clearly show so. Additionally she has positive fecal occult blood. I have  prescribed ferrous gluconate and gave instructions. Referred to gastroenterology and discussed with her.   2. Folate deficiency. Her diet is consistent with this. Prescribed folic acid and discussed with her.   3. She will see me in approximately 8 weeks.   4. Morbid obesity with BMI greater than 40    Plan:  1. As above.     Lucio Landaverde MD on 10/20/2022 at 13:37

## 2022-10-19 NOTE — TELEPHONE ENCOUNTER
Attempted to contact the patient regarding her recent message. Left a voicemail requesting a callback. Direct callback number left on patient voicemail.

## 2022-10-19 NOTE — TELEPHONE ENCOUNTER
Caller: Aurelia Arvizu    Relationship: Self    Best call back number: [phone]      What was the call regarding: PATIENT WANTED TO KNOW IF SHE HAS TO DO HER LABS TOMORROW    Do you require a callback: YES

## 2022-10-19 NOTE — TELEPHONE ENCOUNTER
Patient returned my phone call. I informed her that we will draw a CBC on her tomorrow at her appt but it is her right to refuse the lab draw. She stated she is confused because she thought that her appt was to go over the results from her last appt. I confirmed that Dr. Landaverde will review the results from her last appt tomorrow with her but he also requests that a CBC be drawn tomorrow as well. I informed her that she does not have to have the CBC drawn if she does not wish to do so. She confirmed. I advised her to contact our office if she needs anything or if she has any additional questions. She confirmed.

## 2022-10-20 ENCOUNTER — OFFICE VISIT (OUTPATIENT)
Dept: ONCOLOGY | Facility: CLINIC | Age: 42
End: 2022-10-20

## 2022-10-20 ENCOUNTER — APPOINTMENT (OUTPATIENT)
Dept: LAB | Facility: HOSPITAL | Age: 42
End: 2022-10-20

## 2022-10-20 VITALS
SYSTOLIC BLOOD PRESSURE: 108 MMHG | DIASTOLIC BLOOD PRESSURE: 72 MMHG | BODY MASS INDEX: 43.32 KG/M2 | HEIGHT: 65 IN | TEMPERATURE: 98.8 F | WEIGHT: 260 LBS | OXYGEN SATURATION: 99 % | HEART RATE: 88 BPM

## 2022-10-20 DIAGNOSIS — E53.8 FOLATE DEFICIENCY: ICD-10-CM

## 2022-10-20 DIAGNOSIS — D50.0 IRON DEFICIENCY ANEMIA DUE TO CHRONIC BLOOD LOSS: Primary | ICD-10-CM

## 2022-10-20 PROCEDURE — 99213 OFFICE O/P EST LOW 20 MIN: CPT | Performed by: INTERNAL MEDICINE

## 2022-10-20 RX ORDER — DOXYCYCLINE HYCLATE 50 MG/1
324 CAPSULE, GELATIN COATED ORAL
Qty: 1 TABLET | Refills: 11 | Status: SHIPPED | OUTPATIENT
Start: 2022-10-20

## 2022-10-20 RX ORDER — FOLIC ACID 1 MG/1
1 TABLET ORAL DAILY
Qty: 30 TABLET | Refills: 11 | Status: SHIPPED | OUTPATIENT
Start: 2022-10-20